# Patient Record
Sex: MALE | Race: WHITE | NOT HISPANIC OR LATINO | URBAN - NONMETROPOLITAN AREA
[De-identification: names, ages, dates, MRNs, and addresses within clinical notes are randomized per-mention and may not be internally consistent; named-entity substitution may affect disease eponyms.]

---

## 2023-11-04 PROBLEM — N20.0 LEFT RENAL STONE: Status: ACTIVE | Noted: 2023-11-04

## 2023-11-04 PROBLEM — R10.9 FLANK PAIN: Status: ACTIVE | Noted: 2023-11-04

## 2023-11-04 PROBLEM — N32.89 BLADDER MASS: Status: ACTIVE | Noted: 2023-11-04

## 2023-11-04 PROBLEM — N32.89 ERYTHEMATOUS BLADDER MUCOSA: Status: ACTIVE | Noted: 2023-11-04

## 2023-11-04 PROBLEM — R33.9 URINARY RETENTION: Status: ACTIVE | Noted: 2023-11-04

## 2023-11-04 PROBLEM — R35.1 NOCTURIA: Status: ACTIVE | Noted: 2023-11-04

## 2023-11-04 RX ORDER — ATENOLOL 50 MG/1
1 TABLET ORAL DAILY
COMMUNITY

## 2023-11-04 RX ORDER — NICOTINE 7MG/24HR
1 PATCH, TRANSDERMAL 24 HOURS TRANSDERMAL DAILY
COMMUNITY
Start: 2021-08-17

## 2023-11-04 RX ORDER — HYDROCODONE BITARTRATE AND ACETAMINOPHEN 5; 325 MG/1; MG/1
1 TABLET ORAL EVERY 4 HOURS PRN
COMMUNITY
Start: 2021-09-07

## 2023-11-04 RX ORDER — TAMSULOSIN HYDROCHLORIDE 0.4 MG/1
1 CAPSULE ORAL NIGHTLY
COMMUNITY
Start: 2021-09-01

## 2023-11-04 RX ORDER — ROSUVASTATIN CALCIUM 5 MG/1
5 TABLET, COATED ORAL DAILY
COMMUNITY

## 2023-11-06 ENCOUNTER — PROCEDURE VISIT (OUTPATIENT)
Dept: UROLOGY | Facility: CLINIC | Age: 63
End: 2023-11-06
Payer: COMMERCIAL

## 2023-11-06 VITALS
DIASTOLIC BLOOD PRESSURE: 77 MMHG | WEIGHT: 243 LBS | HEART RATE: 76 BPM | SYSTOLIC BLOOD PRESSURE: 129 MMHG | BODY MASS INDEX: 34.79 KG/M2 | HEIGHT: 70 IN

## 2023-11-06 DIAGNOSIS — N32.89 ERYTHEMATOUS BLADDER MUCOSA: Primary | ICD-10-CM

## 2023-11-06 PROCEDURE — 52000 CYSTOURETHROSCOPY: CPT | Performed by: STUDENT IN AN ORGANIZED HEALTH CARE EDUCATION/TRAINING PROGRAM

## 2023-11-06 RX ORDER — CIPROFLOXACIN 250 MG/1
250 TABLET, FILM COATED ORAL 2 TIMES DAILY
Qty: 6 TABLET | Refills: 0 | Status: SHIPPED | OUTPATIENT
Start: 2023-11-06 | End: 2023-11-09

## 2023-11-06 NOTE — PROGRESS NOTES
Patient ID: Oliver Smith is a 63 y.o. male.    Procedures   The patient was prepped using a Betadine solution. Lidocaine jelly was instilled into the urethra. The flexible cystoscope was sterilely inserted into the urethra and formal cystoscopy performed in a systematic fashion. . For detailed findings of the procedure, please see Dr. Figueroa remarks below  Cipro 20mg pobid times 3 days given     PSA 0.66 (11/28/2023)

## 2023-11-06 NOTE — PROGRESS NOTES
PROCEDURE NOTE:    PREOPERATIVE DIAGNOSIS:  Erythematous bladder mucosa     POSTOPERATIVE DIAGNOSIS:  Same    OPERATION:  Flexible Cystourethroscopy    SURGEON: Jorge A Figueroa MD [facility]     ANESTHESIA:  2%  lidocaine jelly    COMPLICATIONS:  None    EBL: Minimal    SPECIMEN:  Voided urine was not collected and submitted for cytology.    DISPOSITION:  The patient was discharged home after the procedure, per routine.    INDICATIONS: :  Mr. Smiht is a 63 y.o. patient with a history of Erythematous bladder mucosa, bladder tumor who presents today for Cystoscopy.     The indications, risks and benefits of this procedure were discussed with the patient, consent was obtained prior to the procedure, and to the best of my judgement the patient seemed to understand and agree to the procedure.    PROCEDURE:  The patient  was brought into the procedure suite and informed consent was reviewed and confirmed. Vital signs were obtained prior to the procedure: There were no vitals taken for this visit..  The patient was escorted onto the stretcher, placed supine, prepped with betadine and draped in the usual standard surgical fashion.  Intraurethral 2% viscous lidocaine jelly was used for local analgesia.  A 16 English flexible cystourethroscope was inserted into the urethra.   The penile urethra was normal.  The prostate urethra was enlarged.  Upon entering the bladder the entire bladder was surveyed in a 360 degree fashion.  The left and right ureteral orifices were in normal orthotopic position effluxing clear yellow urine, bilaterally.   There was no evidence of any bladder lesions, however, there was still a 1 x 1 cm post bladder wall erythematous lesion, foreign objects, stones or evidence of any mucosal changes. The cystoscope was then retroflexed.  The bladder neck was then further examined without any evidence of lesions. The scope was then removed and in an antegrade fashion, the urethra and bladder were again resurveyed  with no evidence of additional lesions.  The cystoscope was then fully removed.   The patient tolerated the procedure well.  Vitals were stable after the procedure.  The patient was able to void and was discharged home.  Verbal and written Post procedure instructions were reviewed with the patient.    IMPRESSION:  Erythematous bladder mucosa    PLAN:  Cipro 250mg p.o. BID times 3 days given  Cystoscopy in 6 months    Follow up: 6 months with cysto    Oliver Smith is noted in assessment to have a @FLOWCONTINUOUS(18)@/10. The patient's individualized treatment will therefore consist of: See history of present illness..     Scribed for Dr. Jorge A Figueroa by Kae Spann, medical scribe, on 11/06/23 at 8:00 AM

## 2024-02-23 ENCOUNTER — LAB (OUTPATIENT)
Dept: LAB | Facility: LAB | Age: 64
End: 2024-02-23
Payer: COMMERCIAL

## 2024-02-23 ENCOUNTER — TELEPHONE (OUTPATIENT)
Dept: GASTROENTEROLOGY | Facility: CLINIC | Age: 64
End: 2024-02-23

## 2024-02-23 DIAGNOSIS — I10 ESSENTIAL (PRIMARY) HYPERTENSION: Primary | ICD-10-CM

## 2024-02-23 DIAGNOSIS — Z12.5 ENCOUNTER FOR SCREENING FOR MALIGNANT NEOPLASM OF PROSTATE: ICD-10-CM

## 2024-02-23 DIAGNOSIS — Z13.1 ENCOUNTER FOR SCREENING FOR DIABETES MELLITUS: ICD-10-CM

## 2024-02-23 DIAGNOSIS — E78.2 MIXED HYPERLIPIDEMIA: ICD-10-CM

## 2024-02-23 LAB
ALBUMIN SERPL BCP-MCNC: 3.9 G/DL (ref 3.4–5)
ALP SERPL-CCNC: 85 U/L (ref 33–136)
ALT SERPL W P-5'-P-CCNC: 22 U/L (ref 10–52)
ANION GAP SERPL CALC-SCNC: 12 MMOL/L (ref 10–20)
AST SERPL W P-5'-P-CCNC: 20 U/L (ref 9–39)
BASOPHILS # BLD AUTO: 0.01 X10*3/UL (ref 0–0.1)
BASOPHILS NFR BLD AUTO: 0.1 %
BILIRUB SERPL-MCNC: 0.5 MG/DL (ref 0–1.2)
BUN SERPL-MCNC: 13 MG/DL (ref 6–23)
CALCIUM SERPL-MCNC: 8.8 MG/DL (ref 8.6–10.3)
CHLORIDE SERPL-SCNC: 102 MMOL/L (ref 98–107)
CHOLEST SERPL-MCNC: 127 MG/DL (ref 0–199)
CHOLESTEROL/HDL RATIO: 3.1
CO2 SERPL-SCNC: 28 MMOL/L (ref 21–32)
CREAT SERPL-MCNC: 0.76 MG/DL (ref 0.5–1.3)
EGFRCR SERPLBLD CKD-EPI 2021: >90 ML/MIN/1.73M*2
EOSINOPHIL # BLD AUTO: 0.08 X10*3/UL (ref 0–0.7)
EOSINOPHIL NFR BLD AUTO: 1.1 %
ERYTHROCYTE [DISTWIDTH] IN BLOOD BY AUTOMATED COUNT: 13.5 % (ref 11.5–14.5)
EST. AVERAGE GLUCOSE BLD GHB EST-MCNC: 123 MG/DL
GLUCOSE SERPL-MCNC: 87 MG/DL (ref 74–99)
HBA1C MFR BLD: 5.9 %
HCT VFR BLD AUTO: 49.5 % (ref 41–52)
HDLC SERPL-MCNC: 41 MG/DL
HGB BLD-MCNC: 15.8 G/DL (ref 13.5–17.5)
IMM GRANULOCYTES # BLD AUTO: 0.01 X10*3/UL (ref 0–0.7)
IMM GRANULOCYTES NFR BLD AUTO: 0.1 % (ref 0–0.9)
LDLC SERPL CALC-MCNC: 69 MG/DL
LYMPHOCYTES # BLD AUTO: 1.71 X10*3/UL (ref 1.2–4.8)
LYMPHOCYTES NFR BLD AUTO: 23.1 %
MCH RBC QN AUTO: 28.9 PG (ref 26–34)
MCHC RBC AUTO-ENTMCNC: 31.9 G/DL (ref 32–36)
MCV RBC AUTO: 91 FL (ref 80–100)
MONOCYTES # BLD AUTO: 0.5 X10*3/UL (ref 0.1–1)
MONOCYTES NFR BLD AUTO: 6.8 %
NEUTROPHILS # BLD AUTO: 5.09 X10*3/UL (ref 1.2–7.7)
NEUTROPHILS NFR BLD AUTO: 68.8 %
NON HDL CHOLESTEROL: 86 MG/DL (ref 0–149)
NRBC BLD-RTO: 0 /100 WBCS (ref 0–0)
PLATELET # BLD AUTO: 203 X10*3/UL (ref 150–450)
POTASSIUM SERPL-SCNC: 4.5 MMOL/L (ref 3.5–5.3)
PROT SERPL-MCNC: 7.2 G/DL (ref 6.4–8.2)
PSA SERPL-MCNC: 0.99 NG/ML
RBC # BLD AUTO: 5.46 X10*6/UL (ref 4.5–5.9)
SODIUM SERPL-SCNC: 137 MMOL/L (ref 136–145)
TRIGL SERPL-MCNC: 85 MG/DL (ref 0–149)
VLDL: 17 MG/DL (ref 0–40)
WBC # BLD AUTO: 7.4 X10*3/UL (ref 4.4–11.3)

## 2024-02-23 PROCEDURE — 80053 COMPREHEN METABOLIC PANEL: CPT

## 2024-02-23 PROCEDURE — 80061 LIPID PANEL: CPT

## 2024-02-23 PROCEDURE — 84153 ASSAY OF PSA TOTAL: CPT

## 2024-02-23 PROCEDURE — 85025 COMPLETE CBC W/AUTO DIFF WBC: CPT

## 2024-02-23 PROCEDURE — 83036 HEMOGLOBIN GLYCOSYLATED A1C: CPT

## 2024-05-29 ENCOUNTER — TELEPHONE (OUTPATIENT)
Dept: GASTROENTEROLOGY | Facility: CLINIC | Age: 64
End: 2024-05-29
Payer: COMMERCIAL

## 2024-05-29 DIAGNOSIS — Z12.11 COLON CANCER SCREENING: ICD-10-CM

## 2024-05-29 NOTE — TELEPHONE ENCOUNTER
5/29-called patient to schedule colonoscopy. Patient said he will have his wife call us and get it scheduled.

## 2024-07-10 ENCOUNTER — HOSPITAL ENCOUNTER (OUTPATIENT)
Dept: GASTROENTEROLOGY | Facility: CLINIC | Age: 64
Setting detail: OUTPATIENT SURGERY
Discharge: HOME | End: 2024-07-10
Payer: COMMERCIAL

## 2024-07-10 VITALS
HEIGHT: 70 IN | OXYGEN SATURATION: 93 % | TEMPERATURE: 97.3 F | RESPIRATION RATE: 16 BRPM | BODY MASS INDEX: 33.07 KG/M2 | HEART RATE: 88 BPM | DIASTOLIC BLOOD PRESSURE: 75 MMHG | WEIGHT: 231 LBS | SYSTOLIC BLOOD PRESSURE: 116 MMHG

## 2024-07-10 DIAGNOSIS — Z12.11 COLON CANCER SCREENING: ICD-10-CM

## 2024-07-10 PROCEDURE — 7100000010 HC PHASE TWO TIME - EACH INCREMENTAL 1 MINUTE

## 2024-07-10 PROCEDURE — 99152 MOD SED SAME PHYS/QHP 5/>YRS: CPT | Performed by: INTERNAL MEDICINE

## 2024-07-10 PROCEDURE — G0121 COLON CA SCRN NOT HI RSK IND: HCPCS | Performed by: INTERNAL MEDICINE

## 2024-07-10 PROCEDURE — 45378 DIAGNOSTIC COLONOSCOPY: CPT | Performed by: INTERNAL MEDICINE

## 2024-07-10 PROCEDURE — 3700000012 HC SEDATION LEVEL 5+ TIME - INITIAL 15 MINUTES 5/> YEARS

## 2024-07-10 PROCEDURE — 2500000004 HC RX 250 GENERAL PHARMACY W/ HCPCS (ALT 636 FOR OP/ED): Performed by: INTERNAL MEDICINE

## 2024-07-10 PROCEDURE — 7100000009 HC PHASE TWO TIME - INITIAL BASE CHARGE

## 2024-07-10 RX ORDER — MEPERIDINE HYDROCHLORIDE 25 MG/ML
INJECTION INTRAMUSCULAR; INTRAVENOUS; SUBCUTANEOUS AS NEEDED
Status: COMPLETED | OUTPATIENT
Start: 2024-07-10 | End: 2024-07-10

## 2024-07-10 RX ORDER — SODIUM CHLORIDE, SODIUM LACTATE, POTASSIUM CHLORIDE, CALCIUM CHLORIDE 600; 310; 30; 20 MG/100ML; MG/100ML; MG/100ML; MG/100ML
20 INJECTION, SOLUTION INTRAVENOUS CONTINUOUS
Status: DISCONTINUED | OUTPATIENT
Start: 2024-07-10 | End: 2024-07-11 | Stop reason: HOSPADM

## 2024-07-10 RX ORDER — MIDAZOLAM HYDROCHLORIDE 5 MG/ML
INJECTION, SOLUTION INTRAMUSCULAR; INTRAVENOUS AS NEEDED
Status: COMPLETED | OUTPATIENT
Start: 2024-07-10 | End: 2024-07-10

## 2024-07-10 ASSESSMENT — PAIN - FUNCTIONAL ASSESSMENT
PAIN_FUNCTIONAL_ASSESSMENT: 0-10
PAIN_FUNCTIONAL_ASSESSMENT: 0-10

## 2024-07-10 ASSESSMENT — COLUMBIA-SUICIDE SEVERITY RATING SCALE - C-SSRS
1. IN THE PAST MONTH, HAVE YOU WISHED YOU WERE DEAD OR WISHED YOU COULD GO TO SLEEP AND NOT WAKE UP?: NO
6. HAVE YOU EVER DONE ANYTHING, STARTED TO DO ANYTHING, OR PREPARED TO DO ANYTHING TO END YOUR LIFE?: NO
2. HAVE YOU ACTUALLY HAD ANY THOUGHTS OF KILLING YOURSELF?: NO

## 2024-07-10 ASSESSMENT — PAIN SCALES - GENERAL
PAINLEVEL_OUTOF10: 0 - NO PAIN

## 2024-07-10 NOTE — H&P
History Of Present Illness  Oliver Smith is a 63 y.o. male presenting with colon cancer screening.     Past Medical History  Past Medical History:   Diagnosis Date    Hyperlipidemia     Hypertension     Personal history of other diseases of the circulatory system 07/29/2021    History of hypertension     Surgical History  Past Surgical History:   Procedure Laterality Date    CYSTOSCOPY W/ URETERAL STENT PLACEMENT      DENTAL IMPLANT      EXTERNAL EAR SURGERY       Social History  He reports that he has been smoking cigarettes. He does not have any smokeless tobacco history on file. He reports that he does not drink alcohol and does not use drugs.    Family History  Family History   Problem Relation Name Age of Onset    Diabetes Mother      Cancer Father          Allergies  No Known Allergies  Review of Systems  Pre-sedation Evaluation:  ASA Classification - ASA 2 - Patient with mild systemic disease with no functional limitations  Mallampati Score - II (hard and soft palate, upper portion of tonsils and uvula visible)    Physical Exam  Vitals and nursing note reviewed.   Constitutional:       Appearance: Normal appearance.   HENT:      Head: Normocephalic.      Mouth/Throat:      Mouth: Mucous membranes are moist.      Pharynx: Oropharynx is clear.   Eyes:      Pupils: Pupils are equal, round, and reactive to light.   Cardiovascular:      Rate and Rhythm: Normal rate and regular rhythm.      Heart sounds: Normal heart sounds.   Pulmonary:      Effort: Pulmonary effort is normal.      Breath sounds: Normal breath sounds.   Abdominal:      General: Abdomen is flat. Bowel sounds are normal.      Palpations: Abdomen is soft.   Musculoskeletal:         General: Normal range of motion.      Cervical back: Normal range of motion and neck supple.   Skin:     General: Skin is warm and dry.   Neurological:      General: No focal deficit present.      Mental Status: He is alert and oriented to person, place, and time.  "  Psychiatric:         Mood and Affect: Mood normal.         Behavior: Behavior normal.          Last Recorded Vitals  Blood pressure 133/79, pulse 94, temperature 35.6 °C (96.1 °F), temperature source Temporal, resp. rate 16, height 1.778 m (5' 10\"), weight 105 kg (231 lb), SpO2 95%.     Assessment/Plan   Problem List Items Addressed This Visit    None  Visit Diagnoses       Colon cancer screening        Relevant Orders    Colonoscopy Screening; Average Risk Patient               PTA/Current Medications:  (Not in a hospital admission)    Current Outpatient Medications   Medication Sig Dispense Refill    atenolol (Tenormin) 50 mg tablet Take 1 tablet (50 mg) by mouth once daily.      nicotine (Nicoderm CQ) 7 mg/24 hr patch Place 1 patch on the skin once daily.      rosuvastatin (Crestor) 5 mg tablet Take 1 tablet (5 mg) by mouth once daily.       Current Facility-Administered Medications   Medication Dose Route Frequency Provider Last Rate Last Admin    lactated Ringer's infusion  20 mL/hr intravenous Continuous Johnson Belle DO 20 mL/hr at 07/10/24 0944 20 mL/hr at 07/10/24 0944     Johnson Belle DO  "

## 2024-07-10 NOTE — DISCHARGE INSTRUCTIONS
Patient Instructions after a Colonoscopy      The anesthetics, sedatives or narcotics which were given to you today will be acting in your body for the next 24 hours, so you might feel a little sleepy or groggy.  This feeling should slowly wear off. Carefully read and follow the instructions.     You received sedation today:  - Do not drive or operate any machinery or power tools of any kind.   - No alcoholic beverages today, not even beer or wine.  - Do not make any important decisions or sign any legal documents.  - No over the counter medications that contain alcohol or that may cause drowsiness.  - Do not make any important decisions or sign any legal documents.  - Make sure you have someone with you for first 24 hours.    While it is common to experience mild to moderate abdominal distention, gas, or belching after your procedure, if any of these symptoms occur following discharge from the GI Lab or within one week of having your procedure, call the [facility] to be advised whether a visit to your nearest Urgent Care or Emergency Department is indicated.  Take this paper with you if you go.     - If you develop an allergic reaction to the medications that were given during your procedure such as difficulty breathing, rash, hives, severe nausea, vomiting or lightheadedness.  - If you experience chest pain, shortness of breath, severe abdominal pain, fevers and chills.  -If you develop signs and symptoms of bleeding such as blood in your spit, if your stools turn black, tarry, or bloody  - If you have not urinated within 8 hours following your procedure.  - If your IV site becomes painful, red, inflamed, or looks infected.      Your physician recommends the additional following instructions:    -You have a contact number available for emergencies. The signs and symptoms of potential delayed complications were discussed with you. You may return to normal activities tomorrow.  -Resume your previous  diet.  -Continue your present medications.   -We are waiting for your pathology results.  -Your physician has recommended a repeat colonoscopy (date to be determined after pending pathology results are reviewed) for surveillance based on pathology results.  -The findings and recommendations have been discussed with you.  -The findings and recommendations were discussed with your family.  - Please see Medication Reconciliation Form for new medication/medications prescribed.       If you experience any problems or have any questions following discharge from the GI Lab, please call:        Nurse Signature                                                                        Date___________________                                                                            Patient/Responsible Party Signature                                        Date___________________

## 2025-01-13 ENCOUNTER — LAB (OUTPATIENT)
Dept: LAB | Facility: LAB | Age: 65
End: 2025-01-13
Payer: COMMERCIAL

## 2025-01-13 DIAGNOSIS — E78.2 MIXED HYPERLIPIDEMIA: Primary | ICD-10-CM

## 2025-01-13 DIAGNOSIS — Z12.5 ENCOUNTER FOR SCREENING FOR MALIGNANT NEOPLASM OF PROSTATE: ICD-10-CM

## 2025-01-13 DIAGNOSIS — I10 ESSENTIAL (PRIMARY) HYPERTENSION: ICD-10-CM

## 2025-01-13 LAB
ALBUMIN SERPL BCP-MCNC: 4 G/DL (ref 3.4–5)
ALP SERPL-CCNC: 81 U/L (ref 33–136)
ALT SERPL W P-5'-P-CCNC: 17 U/L (ref 10–52)
ANION GAP SERPL CALC-SCNC: 10 MMOL/L (ref 10–20)
AST SERPL W P-5'-P-CCNC: 14 U/L (ref 9–39)
BASOPHILS # BLD AUTO: 0.03 X10*3/UL (ref 0–0.1)
BASOPHILS NFR BLD AUTO: 0.4 %
BILIRUB SERPL-MCNC: 0.5 MG/DL (ref 0–1.2)
BUN SERPL-MCNC: 15 MG/DL (ref 6–23)
CALCIUM SERPL-MCNC: 8.7 MG/DL (ref 8.6–10.3)
CHLORIDE SERPL-SCNC: 103 MMOL/L (ref 98–107)
CHOLEST SERPL-MCNC: 128 MG/DL (ref 0–199)
CHOLESTEROL/HDL RATIO: 2.9
CO2 SERPL-SCNC: 30 MMOL/L (ref 21–32)
CREAT SERPL-MCNC: 0.79 MG/DL (ref 0.5–1.3)
EGFRCR SERPLBLD CKD-EPI 2021: >90 ML/MIN/1.73M*2
EOSINOPHIL # BLD AUTO: 0.1 X10*3/UL (ref 0–0.7)
EOSINOPHIL NFR BLD AUTO: 1.4 %
ERYTHROCYTE [DISTWIDTH] IN BLOOD BY AUTOMATED COUNT: 14.1 % (ref 11.5–14.5)
GLUCOSE SERPL-MCNC: 81 MG/DL (ref 74–99)
HCT VFR BLD AUTO: 50.5 % (ref 41–52)
HDLC SERPL-MCNC: 44 MG/DL
HGB BLD-MCNC: 15.6 G/DL (ref 13.5–17.5)
IMM GRANULOCYTES # BLD AUTO: 0.01 X10*3/UL (ref 0–0.7)
IMM GRANULOCYTES NFR BLD AUTO: 0.1 % (ref 0–0.9)
LDLC SERPL CALC-MCNC: 67 MG/DL
LYMPHOCYTES # BLD AUTO: 1.92 X10*3/UL (ref 1.2–4.8)
LYMPHOCYTES NFR BLD AUTO: 26 %
MCH RBC QN AUTO: 28.5 PG (ref 26–34)
MCHC RBC AUTO-ENTMCNC: 30.9 G/DL (ref 32–36)
MCV RBC AUTO: 92 FL (ref 80–100)
MONOCYTES # BLD AUTO: 0.53 X10*3/UL (ref 0.1–1)
MONOCYTES NFR BLD AUTO: 7.2 %
NEUTROPHILS # BLD AUTO: 4.79 X10*3/UL (ref 1.2–7.7)
NEUTROPHILS NFR BLD AUTO: 64.9 %
NON HDL CHOLESTEROL: 84 MG/DL (ref 0–149)
NRBC BLD-RTO: 0 /100 WBCS (ref 0–0)
PLATELET # BLD AUTO: 231 X10*3/UL (ref 150–450)
POTASSIUM SERPL-SCNC: 4.9 MMOL/L (ref 3.5–5.3)
PROT SERPL-MCNC: 6.7 G/DL (ref 6.4–8.2)
PSA SERPL-MCNC: 1.29 NG/ML
RBC # BLD AUTO: 5.47 X10*6/UL (ref 4.5–5.9)
SODIUM SERPL-SCNC: 138 MMOL/L (ref 136–145)
TRIGL SERPL-MCNC: 84 MG/DL (ref 0–149)
VLDL: 17 MG/DL (ref 0–40)
WBC # BLD AUTO: 7.4 X10*3/UL (ref 4.4–11.3)

## 2025-01-13 PROCEDURE — 84153 ASSAY OF PSA TOTAL: CPT

## 2025-01-13 PROCEDURE — 80053 COMPREHEN METABOLIC PANEL: CPT

## 2025-01-13 PROCEDURE — 85025 COMPLETE CBC W/AUTO DIFF WBC: CPT

## 2025-01-13 PROCEDURE — 80061 LIPID PANEL: CPT

## 2025-06-18 NOTE — PROGRESS NOTES
Subjective   Patient ID: Oliver Smith is a 64 y.o. male    HPI  64 y.o. male who presents for a follow-up visit with Bladder Mucosa. ***    The most recent PSA, conducted on 01/13/2025, revealed:  1.29 ng/mL       Review of Systems    All systems were reviewed. Anything negative was noted in the HPI.    Objective   Physical Exam    General: Well developed, well nourished, alert and cooperative, appears in no acute distress   Eyes: Non-injected conjunctiva, sclera clear, no proptosis   Cardiac: Extremities are warm and well perfused. No edema, cyanosis or pallor   Lungs: Breathing is easy, non-labored. Speaking in clear and complete sentences. Normal diaphragmatic movement   MSK: Ambulatory with steady gait, unassisted   Neuro: Alert and oriented to person, place, and time   Psych: Demonstrates good judgment and reason, without hallucinations, abnormal affect or abnormal behaviors   Skin: No obvious lesions, no rashes       No CVA tenderness bilaterally   No suprapubic pain or discomfort       Medical History[1]      Surgical History[2]      ***  Assessment/Plan   ***    64 y.o. male who presents for the above condition, {[id number]}     Plan:  - ***    E&M visit today is associated with current or anticipated ongoing medical care services related to a patient's single, serious condition or a complex condition.     6/19/2025    Scribe Attestation  By signing my name below, Alysa ALBA, Olivia attest that this documentation has been prepared under the direction and in the presence of Dr. Jorge A Figueroa.        [1]   Past Medical History:  Diagnosis Date    Hyperlipidemia     Hypertension     Personal history of other diseases of the circulatory system 07/29/2021    History of hypertension   [2]   Past Surgical History:  Procedure Laterality Date    CYSTOSCOPY W/ URETERAL STENT PLACEMENT      DENTAL IMPLANT      EXTERNAL EAR SURGERY

## 2025-06-19 ENCOUNTER — APPOINTMENT (OUTPATIENT)
Dept: RADIOLOGY | Facility: HOSPITAL | Age: 65
End: 2025-06-19
Payer: COMMERCIAL

## 2025-06-19 ENCOUNTER — HOSPITAL ENCOUNTER (EMERGENCY)
Facility: HOSPITAL | Age: 65
Discharge: HOME | End: 2025-06-19
Attending: EMERGENCY MEDICINE
Payer: COMMERCIAL

## 2025-06-19 ENCOUNTER — APPOINTMENT (OUTPATIENT)
Dept: UROLOGY | Facility: HOSPITAL | Age: 65
End: 2025-06-19
Payer: COMMERCIAL

## 2025-06-19 VITALS
RESPIRATION RATE: 20 BRPM | TEMPERATURE: 97.2 F | HEART RATE: 85 BPM | HEIGHT: 70 IN | DIASTOLIC BLOOD PRESSURE: 74 MMHG | BODY MASS INDEX: 31.5 KG/M2 | OXYGEN SATURATION: 98 % | SYSTOLIC BLOOD PRESSURE: 138 MMHG | WEIGHT: 220 LBS

## 2025-06-19 DIAGNOSIS — K59.00 CONSTIPATION, UNSPECIFIED CONSTIPATION TYPE: ICD-10-CM

## 2025-06-19 DIAGNOSIS — K59.09 OTHER CONSTIPATION: Primary | ICD-10-CM

## 2025-06-19 PROCEDURE — 99283 EMERGENCY DEPT VISIT LOW MDM: CPT | Performed by: EMERGENCY MEDICINE

## 2025-06-19 PROCEDURE — 74022 RADEX COMPL AQT ABD SERIES: CPT

## 2025-06-19 PROCEDURE — 74018 RADEX ABDOMEN 1 VIEW: CPT | Mod: FOREIGN READ | Performed by: RADIOLOGY

## 2025-06-19 PROCEDURE — 71045 X-RAY EXAM CHEST 1 VIEW: CPT | Mod: FOREIGN READ | Performed by: RADIOLOGY

## 2025-06-19 NOTE — ED PROVIDER NOTES
"HPI   Chief Complaint   Patient presents with    Constipation     C/o constipation. Pt reports rectal pain and feels like he has impaction. Denies use of any stool softeners, enemas, suppositories, etc. Last BM yesterday, states \"very small amount\".       Patient presents to the emergency department secondary to concern for constipation.  He moved his bowels yesterday.  Attempted to move his bowels this morning but had the sensation of a full rectum and unable to evacuate his bowels.  He attempted to disimpact himself at home.  Took nothing over-the-counter to help with his symptoms      History provided by:  Patient   used: No            Patient History   Medical History[1]  Surgical History[2]  Family History[3]  Social History[4]    Physical Exam   ED Triage Vitals [06/19/25 0605]   Temperature Heart Rate Respirations BP   36.2 °C (97.2 °F) 91 18 144/89      Pulse Ox Temp Source Heart Rate Source Patient Position   94 % Temporal Monitor Sitting      BP Location FiO2 (%)     Left arm --       Physical Exam  Vitals and nursing note reviewed.   Constitutional:       General: He is not in acute distress.     Appearance: Normal appearance. He is obese. He is not ill-appearing, toxic-appearing or diaphoretic.      Comments: Resting comfortably   HENT:      Head: Normocephalic and atraumatic.      Nose: Nose normal. No rhinorrhea.   Neck:      Comments: Trachea is midline  Cardiovascular:      Rate and Rhythm: Normal rate and regular rhythm.      Heart sounds: No murmur heard.  Pulmonary:      Effort: Pulmonary effort is normal.      Breath sounds: Normal breath sounds. No wheezing.   Abdominal:      General: Abdomen is flat. Bowel sounds are normal. There is no distension.      Palpations: Abdomen is soft.      Tenderness: There is no abdominal tenderness.   Genitourinary:     Rectum: Normal.      Comments: Digital rectal examination reveals a small, 1 cm piece of hard stool in the rectal vault " which was removed with the remaining stool being soft.  Musculoskeletal:         General: Normal range of motion.      Cervical back: Normal range of motion.   Skin:     General: Skin is warm and dry.      Findings: No rash.   Neurological:      General: No focal deficit present.      Mental Status: He is alert and oriented to person, place, and time. Mental status is at baseline.   Psychiatric:         Mood and Affect: Mood normal.         Behavior: Behavior normal.         Thought Content: Thought content normal.         Judgment: Judgment normal.           ED Course & MDM                  No data recorded                                 Medical Decision Making  We will check an x-ray to ensure there is no evidence of bowel obstruction or ileus.  If this is unremarkable plan will be to give the patient an enema to help relieve his symptoms.    Patient was endorsed to the oncoming provider.  Please see their note for interpretation of the pending studies and final disposition details        Procedure  Procedures       [1]   Past Medical History:  Diagnosis Date    Hyperlipidemia     Hypertension     Personal history of other diseases of the circulatory system 07/29/2021    History of hypertension   [2]   Past Surgical History:  Procedure Laterality Date    CYSTOSCOPY W/ URETERAL STENT PLACEMENT      DENTAL IMPLANT      EXTERNAL EAR SURGERY     [3]   Family History  Problem Relation Name Age of Onset    Diabetes Mother      Cancer Father     [4]   Social History  Tobacco Use    Smoking status: Every Day     Types: Cigarettes    Smokeless tobacco: Not on file   Vaping Use    Vaping status: Never Used   Substance Use Topics    Alcohol use: Never    Drug use: Never        Beto Barron DO  06/19/25 0636

## 2025-06-19 NOTE — DISCHARGE INSTRUCTIONS
If you develop abdominal pain symptoms and/or fever I recommend return to the closest ED for reevaluation.  You can use over-the-counter MiraLAX to help with your constipation.  Follow-up with your primary care doctor within a week.

## 2025-06-19 NOTE — ED PROVIDER NOTES
"  Medical Decision Making  Patient care was taken over by myself at 7 AM.  Patient presents to the ED with complaint of constipation and was concerned about possible fecal impaction.  Rectal examination performed which revealed no evidence of impaction and patient had very small amount of stool in the ampulla.  Patient was ordered x-rays and if those are negative the patient will be treated with an enema per plan by Dr. Barron.  X-ray results per the radiologist revealed chest x-ray with no acute cardiopulmonary findings.  Abdominal x-ray bowel gas pattern is normal without obstruction or ileus.  I went in and talked to the patient and the patient's partner concerning x-ray results and plan of care including the enema.  Patient's partner indicated that the patient has never had an enema and did not want one at this point time would rather try to use MiraLAX over-the-counter at home to deal with his constipation symptoms because he wanted go to work today.  The patient was then discharged home encouraged to take over-the-counter MiraLAX at higher dosages if needed to help with his constipation.  I did encourage him to return to the ED if he developed any new or worsening symptoms such as abdominal pain and/or fever.  The patient was discharged home in stable satisfactory condition.        Emergency Medicine Transition of Care Note.    I received Oliver Smith in signout from Dr. Barron.  Please see the previous ED provider note for all HPI, PE and MDM up to the time of signout at 0700. This is in addition to the primary record.    In brief Oliver Smith is an 64 y.o. male presenting for   Chief Complaint   Patient presents with    Constipation     C/o constipation. Pt reports rectal pain and feels like he has impaction. Denies use of any stool softeners, enemas, suppositories, etc. Last BM yesterday, states \"very small amount\".     At the time of signout we were awaiting: X-ray results.  If this is negative the " patient to be treated with a enema and discharged home.          Final diagnoses:   [K59.09] Other constipation   [K59.00] Constipation, unspecified constipation type           Procedure  Procedures    DO See Hernandes DO  06/20/25 6012

## 2025-09-22 ENCOUNTER — APPOINTMENT (OUTPATIENT)
Dept: UROLOGY | Facility: CLINIC | Age: 65
End: 2025-09-22
Payer: COMMERCIAL